# Patient Record
Sex: FEMALE | Race: WHITE | URBAN - METROPOLITAN AREA
[De-identification: names, ages, dates, MRNs, and addresses within clinical notes are randomized per-mention and may not be internally consistent; named-entity substitution may affect disease eponyms.]

---

## 2019-12-23 ENCOUNTER — OFFICE VISIT (OUTPATIENT)
Dept: FAMILY MEDICINE CLINIC | Age: 24
End: 2019-12-23
Payer: COMMERCIAL

## 2019-12-23 VITALS
BODY MASS INDEX: 24.99 KG/M2 | RESPIRATION RATE: 18 BRPM | TEMPERATURE: 98.2 F | HEART RATE: 84 BPM | WEIGHT: 164.9 LBS | SYSTOLIC BLOOD PRESSURE: 141 MMHG | DIASTOLIC BLOOD PRESSURE: 89 MMHG | OXYGEN SATURATION: 99 % | HEIGHT: 68 IN

## 2019-12-23 PROCEDURE — 99213 OFFICE O/P EST LOW 20 MIN: CPT | Performed by: PHYSICIAN ASSISTANT

## 2019-12-23 RX ORDER — BENZONATATE 200 MG/1
200 CAPSULE ORAL 3 TIMES DAILY PRN
Qty: 21 CAPSULE | Refills: 0 | Status: SHIPPED | OUTPATIENT
Start: 2019-12-23 | End: 2019-12-30

## 2019-12-23 RX ORDER — AZITHROMYCIN 250 MG/1
250 TABLET, FILM COATED ORAL SEE ADMIN INSTRUCTIONS
Qty: 6 TABLET | Refills: 0 | Status: SHIPPED | OUTPATIENT
Start: 2019-12-23 | End: 2019-12-28

## 2019-12-23 RX ORDER — PREDNISONE 20 MG/1
20 TABLET ORAL 2 TIMES DAILY
Qty: 10 TABLET | Refills: 0 | Status: SHIPPED | OUTPATIENT
Start: 2019-12-23 | End: 2019-12-28

## 2019-12-23 RX ORDER — ALPRAZOLAM 0.5 MG/1
0.5 TABLET ORAL
COMMUNITY

## 2019-12-23 RX ORDER — ASPIRIN 81 MG/1
81 TABLET, CHEWABLE ORAL
COMMUNITY

## 2019-12-23 RX ORDER — ALBUTEROL SULFATE 90 UG/1
AEROSOL, METERED RESPIRATORY (INHALATION)
COMMUNITY
Start: 2018-04-23

## 2019-12-23 NOTE — PROGRESS NOTES
[address] [facility]  [address] [facility] [address]  Phone: [phone]  Fax: [phone]       [facility] Walk - In    Pt Name: Saverio Libman  MRN: [id number]  [place] 1995  Date of evaluation: 12/23/2019  Provider: Jason Cárdenas, [facility]       Chief Complaint   Patient presents with    Cough     SOB, Maintance inhaler            HISTORY OF PRESENT ILLNESS  (Location/Symptom, Timing/Onset, Context/Setting, Quality, Duration, Modifying Factors, Severity.)   Saverio Libman is a 21 y.o. White [1] female who presents to the office for evaluation of      Cough   This is a new problem. The current episode started in the past 7 days. The problem has been gradually worsening. The problem occurs every few minutes. The cough is productive of sputum. Associated symptoms include ear congestion, headaches, nasal congestion, postnasal drip and rhinorrhea. Pertinent negatives include no chest pain, chills, ear pain, fever, heartburn, hemoptysis, myalgias, rash, sore throat, shortness of breath, sweats, weight loss or wheezing. She has tried OTC cough suppressant for the symptoms. Nursing Notes were reviewed. REVIEW OF SYSTEMS    (2-9 systems for level 4, 10 or more for level 5)     Review of Systems   Constitutional: Negative for chills, diaphoresis, fever and weight loss. HENT: Positive for congestion, postnasal drip, rhinorrhea and sinus pain. Negative for ear pain and sore throat. Eyes: Negative. Respiratory: Positive for cough. Negative for hemoptysis, shortness of breath and wheezing. Cardiovascular: Negative for chest pain. Gastrointestinal: Negative. Negative for heartburn. Genitourinary: Negative. Musculoskeletal: Negative for myalgias. Skin: Negative for rash. Neurological: Positive for headaches. Except as noted above the remainder of the review of systems was reviewed andnegative.        PAST MEDICAL HISTORY   History reviewed. No past medical history on file. SURGICAL HISTORY     History reviewed. No past surgical history on file. CURRENT MEDICATIONS       Current Outpatient Medications   Medication Sig Dispense Refill    albuterol sulfate HFA (PROAIR HFA) 108 (90 Base) MCG/ACT inhaler inhale 2 puffs by mouth every 4 to 6 hours if needed for wheezing      ALPRAZolam (XANAX) 0.5 MG tablet Take 0.5 mg by mouth.  aspirin 81 MG chewable tablet Take 81 mg by mouth       No current facility-administered medications for this visit. ALLERGIES     Latex and Sulfamethoxazole-trimethoprim    FAMILY HISTORY     No family history on file. No family status information on file. SOCIAL HISTORY      reports that she has never smoked. She has never used smokeless tobacco.      PHYSICAL EXAM    (up to 7 for level 4, 8 or more for level 5)     Vitals:    12/23/19 1810   BP: (!) 141/89   Pulse: 84   Resp: 18   Temp: 98.2 °F (36.8 °C)   TempSrc: Temporal   SpO2: 99%   Weight: 164 lb 14.4 oz (74.8 kg)   Height: 5' 8\" (1.727 m)         Physical Exam  Vitals signs and nursing note reviewed. Constitutional:       Appearance: Normal appearance. HENT:      Head: Normocephalic and atraumatic. Right Ear: Tympanic membrane, ear canal and external ear normal. There is no impacted cerumen. Left Ear: Tympanic membrane, ear canal and external ear normal. There is no impacted cerumen. Nose: Congestion and rhinorrhea present. Mouth/Throat:      Mouth: Mucous membranes are moist.      Pharynx: Posterior oropharyngeal erythema present. Eyes:      General: No scleral icterus. Right eye: No discharge. Extraocular Movements: Extraocular movements intact. Conjunctiva/sclera: Conjunctivae normal.      Pupils: Pupils are equal, round, and reactive to light. Cardiovascular:      Rate and Rhythm: Normal rate and regular rhythm.    Pulmonary:      Effort: Pulmonary effort is normal.      Breath sounds: Normal breath sounds. Neurological:      Mental Status: She is alert. DIFFERENTIAL DIAGNOSIS:       Chalo Sotomayor reviewed the disposition diagnosis with the patient and or their family/guardian. I have answered their questions and given discharge instructions. They voiced understanding of these instructions and did not have anyfurther questions or complaints. PROCEDURES:  No orders of the defined types were placed in this encounter. No results found for this visit on 12/23/19. FINALIMPRESSION      1. Bronchitis    2. Acute sinusitis, recurrence not specified, unspecified location            PLAN     Return if symptoms worsen or fail to improve. DISCHARGEMEDICATIONS:  Orders Placed This Encounter   Medications    azithromycin (ZITHROMAX) 250 MG tablet     Sig: Take 1 tablet by mouth See Admin Instructions for 5 days 500mg on day 1 followed by 250mg on days 2 - 5     Dispense:  6 tablet     Refill:  0    predniSONE (DELTASONE) 20 MG tablet     Sig: Take 1 tablet by mouth 2 times daily for 5 days     Dispense:  10 tablet     Refill:  0    benzonatate (TESSALON) 200 MG capsule     Sig: Take 1 capsule by mouth 3 times daily as needed for Cough     Dispense:  21 capsule     Refill:  0         Plan:  Based on the duration and severity of the symptoms-- I will treat this as bacterial at this time. Patient instructed to complete antibiotic prescription fully. May use Motrin/Tylenol for fever/pain. Saline washes, salt water gargles and over the counter preparations if desired. Patient agreeable to treatment plan. Educational materials provided on AVS.  Follow up if symptoms do not improve/worsen. Patient instructed to return to the office if symptoms worsen, return, or have any other concerns. Patient understands and is agreeable.          Krysten Wong PA-C 1/7/2020 7:29 PM

## 2019-12-23 NOTE — PATIENT INSTRUCTIONS
Patient Education        Bronchitis: Care Instructions  Your Care Instructions    Bronchitis is inflammation of the bronchial tubes, which carry air to the lungs. The tubes swell and produce mucus, or phlegm. The mucus and inflamed bronchial tubes make you cough. You may have trouble breathing. Most cases of bronchitis are caused by viruses like those that cause colds. Antibiotics usually do not help and they may be harmful. Bronchitis usually develops rapidly and lasts about 2 to 3 weeks in otherwise healthy people. Follow-up care is a key part of your treatment and safety. Be sure to make and go to all appointments, and call your doctor if you are having problems. It's also a good idea to know your test results and keep a list of the medicines you take. How can you care for yourself at home? · Take all medicines exactly as prescribed. Call your doctor if you think you are having a problem with your medicine. · Get some extra rest.  · Take an over-the-counter pain medicine, such as acetaminophen (Tylenol), ibuprofen (Advil, Motrin), or naproxen (Aleve) to reduce fever and relieve body aches. Read and follow all instructions on the label. · Do not take two or more pain medicines at the same time unless the doctor told you to. Many pain medicines have acetaminophen, which is Tylenol. Too much acetaminophen (Tylenol) can be harmful. · Take an over-the-counter cough medicine that contains dextromethorphan to help quiet a dry, hacking cough so that you can sleep. Avoid cough medicines that have more than one active ingredient. Read and follow all instructions on the label. · Breathe moist air from a humidifier, hot shower, or sink filled with hot water. The heat and moisture will thin mucus so you can cough it out. · Do not smoke. Smoking can make bronchitis worse. If you need help quitting, talk to your doctor about stop-smoking programs and medicines.  These can increase your chances of quitting for good.  When should you call for help? Call 911 anytime you think you may need emergency care. For example, call if:    · You have severe trouble breathing.    Call your doctor now or seek immediate medical care if:    · You have new or worse trouble breathing.     · You cough up dark brown or bloody mucus (sputum).     · You have a new or higher fever.     · You have a new rash.    Watch closely for changes in your health, and be sure to contact your doctor if:    · You cough more deeply or more often, especially if you notice more mucus or a change in the color of your mucus.     · You are not getting better as expected. Where can you learn more? Go to https://Cell>Point.[employer]Trade Systems. org and sign in to your [employer] account. Enter H333 in the LogicSource box to learn more about \"Bronchitis: Care Instructions. \"     If you do not have an account, please click on the \"Sign Up Now\" link. Current as of: September 5, 2018  Content Version: 12.1  © 6246-7808 [employer]. Care instructions adapted under license by [facility] ([facility]). If you have questions about a medical condition or this instruction, always ask your healthcare professional. [place] [address] any warranty or liability for your use of this information. Patient Education        Sinusitis: Care Instructions  Your Care Instructions    Sinusitis is an infection of the lining of the sinus cavities in your head. Sinusitis often follows a cold. It causes pain and pressure in your head and face. In most cases, sinusitis gets better on its own in 1 to 2 weeks. But some mild symptoms may last for several weeks. Sometimes antibiotics are needed. Follow-up care is a key part of your treatment and safety. Be sure to make and go to all appointments, and call your doctor if you are having problems. It's also a good idea to know your test results and keep a list of the medicines you take.   How can you care for

## 2020-01-07 ASSESSMENT — ENCOUNTER SYMPTOMS
HEARTBURN: 0
EYES NEGATIVE: 1
COUGH: 1
RHINORRHEA: 1
SINUS PAIN: 1
WHEEZING: 0
GASTROINTESTINAL NEGATIVE: 1
HEMOPTYSIS: 0
SORE THROAT: 0
SHORTNESS OF BREATH: 0

## 2020-01-15 ENCOUNTER — HOSPITAL ENCOUNTER (OUTPATIENT)
Age: 25
Setting detail: SPECIMEN
Discharge: HOME | End: 2020-01-15
Payer: COMMERCIAL

## 2020-01-15 LAB
-: ABNORMAL
AMORPHOUS: ABNORMAL
BACTERIA: ABNORMAL
BILIRUBIN URINE: NEGATIVE
CASTS UA: ABNORMAL /LPF (ref 0–8)
COLOR: YELLOW
COMMENT UA: ABNORMAL
CRYSTALS, UA: ABNORMAL /HPF
EPITHELIAL CELLS UA: ABNORMAL /HPF (ref 0–5)
GLUCOSE URINE: NEGATIVE
KETONES, URINE: NEGATIVE
LEUKOCYTE ESTERASE, URINE: ABNORMAL
MUCUS: ABNORMAL
NITRITE, URINE: NEGATIVE
OTHER OBSERVATIONS UA: ABNORMAL
PH UA: 5.5 (ref 5–8)
PROTEIN UA: NEGATIVE
RBC UA: ABNORMAL /HPF (ref 0–4)
RENAL EPITHELIAL, UA: ABNORMAL /HPF
SPECIFIC GRAVITY UA: 1.01 (ref 1–1.03)
TRICHOMONAS: ABNORMAL
TURBIDITY: CLEAR
URINE HGB: NEGATIVE
UROBILINOGEN, URINE: NORMAL
WBC UA: ABNORMAL /HPF (ref 0–5)
YEAST: ABNORMAL

## 2020-01-15 PROCEDURE — 87186 SC STD MICRODIL/AGAR DIL: CPT

## 2020-01-15 PROCEDURE — 87086 URINE CULTURE/COLONY COUNT: CPT

## 2020-01-15 PROCEDURE — 87088 URINE BACTERIA CULTURE: CPT

## 2020-01-15 PROCEDURE — 81001 URINALYSIS AUTO W/SCOPE: CPT

## 2020-01-16 LAB
CULTURE: ABNORMAL
Lab: ABNORMAL
SPECIMEN DESCRIPTION: ABNORMAL

## 2020-10-20 ENCOUNTER — HOSPITAL ENCOUNTER (OUTPATIENT)
Age: 25
Setting detail: SPECIMEN
Discharge: HOME | End: 2020-10-20
Payer: COMMERCIAL

## 2020-10-20 LAB
ALBUMIN SERPL-MCNC: 5.1 G/DL (ref 3.5–5.2)
ALBUMIN/GLOBULIN RATIO: 1.8 (ref 1–2.5)
ALP BLD-CCNC: 68 U/L (ref 35–104)
ALT SERPL-CCNC: 17 U/L (ref 5–33)
ANION GAP SERPL CALCULATED.3IONS-SCNC: 11 MMOL/L (ref 9–17)
AST SERPL-CCNC: 27 U/L
BILIRUB SERPL-MCNC: 0.4 MG/DL (ref 0.3–1.2)
BUN BLDV-MCNC: 8 MG/DL (ref 6–20)
BUN/CREAT BLD: NORMAL (ref 9–20)
CALCIUM SERPL-MCNC: 10 MG/DL (ref 8.6–10.4)
CHLORIDE BLD-SCNC: 104 MMOL/L (ref 98–107)
CO2: 27 MMOL/L (ref 20–31)
CREAT SERPL-MCNC: 0.81 MG/DL (ref 0.5–0.9)
GFR AFRICAN AMERICAN: >60 ML/MIN
GFR NON-AFRICAN AMERICAN: >60 ML/MIN
GFR SERPL CREATININE-BSD FRML MDRD: NORMAL ML/MIN/{1.73_M2}
GFR SERPL CREATININE-BSD FRML MDRD: NORMAL ML/MIN/{1.73_M2}
GLUCOSE BLD-MCNC: 75 MG/DL (ref 70–99)
HCT VFR BLD CALC: 42.4 % (ref 36.3–47.1)
HEMOGLOBIN: 13.7 G/DL (ref 11.9–15.1)
MCH RBC QN AUTO: 31.6 PG (ref 25.2–33.5)
MCHC RBC AUTO-ENTMCNC: 32.3 G/DL (ref 28.4–34.8)
MCV RBC AUTO: 97.9 FL (ref 82.6–102.9)
NRBC AUTOMATED: 0 PER 100 WBC
PDW BLD-RTO: 13 % (ref 11.8–14.4)
PLATELET # BLD: 247 K/UL (ref 138–453)
PMV BLD AUTO: 10.3 FL (ref 8.1–13.5)
POTASSIUM SERPL-SCNC: 3.9 MMOL/L (ref 3.7–5.3)
RBC # BLD: 4.33 M/UL (ref 3.95–5.11)
SODIUM BLD-SCNC: 142 MMOL/L (ref 135–144)
TOTAL PROTEIN: 7.9 G/DL (ref 6.4–8.3)
TSH SERPL DL<=0.05 MIU/L-ACNC: 1.86 MIU/L (ref 0.3–5)
WBC # BLD: 6.9 K/UL (ref 3.5–11.3)

## 2020-10-20 PROCEDURE — 85027 COMPLETE CBC AUTOMATED: CPT

## 2020-10-20 PROCEDURE — 84443 ASSAY THYROID STIM HORMONE: CPT

## 2020-10-20 PROCEDURE — 80053 COMPREHEN METABOLIC PANEL: CPT

## 2021-07-16 ENCOUNTER — OFFICE VISIT (OUTPATIENT)
Dept: FAMILY MEDICINE CLINIC | Age: 26
End: 2021-07-16
Payer: COMMERCIAL

## 2021-07-16 ENCOUNTER — HOSPITAL ENCOUNTER (OUTPATIENT)
Age: 26
Setting detail: SPECIMEN
Discharge: HOME | End: 2021-07-16
Payer: COMMERCIAL

## 2021-07-16 VITALS
WEIGHT: 153 LBS | BODY MASS INDEX: 23.19 KG/M2 | TEMPERATURE: 99 F | HEART RATE: 67 BPM | SYSTOLIC BLOOD PRESSURE: 137 MMHG | HEIGHT: 68 IN | DIASTOLIC BLOOD PRESSURE: 87 MMHG

## 2021-07-16 DIAGNOSIS — R05.9 COUGH: ICD-10-CM

## 2021-07-16 DIAGNOSIS — J06.9 VIRAL URI: Primary | ICD-10-CM

## 2021-07-16 PROCEDURE — 1036F TOBACCO NON-USER: CPT | Performed by: NURSE PRACTITIONER

## 2021-07-16 PROCEDURE — 99212 OFFICE O/P EST SF 10 MIN: CPT | Performed by: NURSE PRACTITIONER

## 2021-07-16 PROCEDURE — G8420 CALC BMI NORM PARAMETERS: HCPCS | Performed by: NURSE PRACTITIONER

## 2021-07-16 PROCEDURE — G8427 DOCREV CUR MEDS BY ELIG CLIN: HCPCS | Performed by: NURSE PRACTITIONER

## 2021-07-16 RX ORDER — BUPROPION HYDROCHLORIDE 150 MG/1
150 TABLET ORAL DAILY
COMMUNITY
Start: 2021-07-12 | End: 2021-08-27

## 2021-07-16 SDOH — ECONOMIC STABILITY: FOOD INSECURITY: WITHIN THE PAST 12 MONTHS, THE FOOD YOU BOUGHT JUST DIDN'T LAST AND YOU DIDN'T HAVE MONEY TO GET MORE.: NEVER TRUE

## 2021-07-16 SDOH — ECONOMIC STABILITY: FOOD INSECURITY: WITHIN THE PAST 12 MONTHS, YOU WORRIED THAT YOUR FOOD WOULD RUN OUT BEFORE YOU GOT MONEY TO BUY MORE.: NEVER TRUE

## 2021-07-16 ASSESSMENT — PATIENT HEALTH QUESTIONNAIRE - PHQ9
SUM OF ALL RESPONSES TO PHQ QUESTIONS 1-9: 0
SUM OF ALL RESPONSES TO PHQ QUESTIONS 1-9: 0
SUM OF ALL RESPONSES TO PHQ9 QUESTIONS 1 & 2: 0
1. LITTLE INTEREST OR PLEASURE IN DOING THINGS: 0
SUM OF ALL RESPONSES TO PHQ QUESTIONS 1-9: 0
2. FEELING DOWN, DEPRESSED OR HOPELESS: 0

## 2021-07-16 ASSESSMENT — ENCOUNTER SYMPTOMS
COUGH: 1
SHORTNESS OF BREATH: 1

## 2021-07-16 ASSESSMENT — SOCIAL DETERMINANTS OF HEALTH (SDOH): HOW HARD IS IT FOR YOU TO PAY FOR THE VERY BASICS LIKE FOOD, HOUSING, MEDICAL CARE, AND HEATING?: NOT HARD AT ALL

## 2021-07-16 NOTE — PATIENT INSTRUCTIONS
Patient Education        Upper Respiratory Infection (Cold): Care Instructions  Your Care Instructions     An upper respiratory infection, or URI, is an infection of the nose, sinuses, or throat. URIs are spread by coughs, sneezes, and direct contact. The common cold is the most frequent kind of URI. The flu and sinus infections are other kinds of URIs. Almost all URIs are caused by viruses. Antibiotics won't cure them. But you can treat most infections with home care. This may include drinking lots of fluids and taking over-the-counter pain medicine. You will probably feel better in 4 to 10 days. The doctor has checked you carefully, but problems can develop later. If you notice any problems or new symptoms, get medical treatment right away. Follow-up care is a key part of your treatment and safety. Be sure to make and go to all appointments, and call your doctor if you are having problems. It's also a good idea to know your test results and keep a list of the medicines you take. How can you care for yourself at home? · To prevent dehydration, drink plenty of fluids. Choose water and other caffeine-free clear liquids until you feel better. If you have kidney, heart, or liver disease and have to limit fluids, talk with your doctor before you increase the amount of fluids you drink. · Take an over-the-counter pain medicine, such as acetaminophen (Tylenol), ibuprofen (Advil, Motrin), or naproxen (Aleve). Read and follow all instructions on the label. · Before you use cough and cold medicines, check the label. These medicines may not be safe for young children or for people with certain health problems. · Be careful when taking over-the-counter cold or flu medicines and Tylenol at the same time. Many of these medicines have acetaminophen, which is Tylenol. Read the labels to make sure that you are not taking more than the recommended dose. Too much acetaminophen (Tylenol) can be harmful.   · Get plenty of rest.  · Do not smoke or allow others to smoke around you. If you need help quitting, talk to your doctor about stop-smoking programs and medicines. These can increase your chances of quitting for good. When should you call for help? Call 911 anytime you think you may need emergency care. For example, call if:    · You have severe trouble breathing. Call your doctor now or seek immediate medical care if:    · You seem to be getting much sicker.     · You have new or worse trouble breathing.     · You have a new or higher fever.     · You have a new rash. Watch closely for changes in your health, and be sure to contact your doctor if:    · You have a new symptom, such as a sore throat, an earache, or sinus pain.     · You cough more deeply or more often, especially if you notice more mucus or a change in the color of your mucus.     · You do not get better as expected. Where can you learn more? Go to https://Philoptima.Ha[employer]ang Online Information Technology. org and sign in to your [employer] account. Enter N116 in the Marketshot box to learn more about \"Upper Respiratory Infection (Cold): Care Instructions. \"     If you do not have an account, please click on the \"Sign Up Now\" link. Current as of: October 26, 2020               Content Version: 12.9  © 2006-2021 Healthwise, Incorporated. Care instructions adapted under license by [facility] ([facility]). If you have questions about a medical condition or this instruction, always ask your healthcare professional. [place] [address] any warranty or liability for your use of this information.        Hot steamy showers  Dahlia Holes Med nasal rinse   Cough drops  Take zinc 50 mg and Vit C 1000 mg   Use your inhaler as ordered for cough and shortness of breath  Warm tea and honey for congestion

## 2021-07-16 NOTE — PROGRESS NOTES
[address] WALK-IN  [facility] [address] [place]  Dept: [phone]  Dept Fax: [phone]    Diann Mcintosh is a 31 y.o. female who presents today for her medical conditions/complaints as noted below. Diann Mcintosh is c/o of Cough (constant cough asthmatic and chest heaviness times 2 weeks)      HPI:    Diann Mcintosh is a 31 y.o. female who presents with  Danni presents to the [employer] with c/o cough. She has a history of asthma. She has had a cough for the past two weeks, She has an inhaler and has used it at times, but does not carry it with her. She saw her primary doctor on Monday and he assured her that her lungs were clear and she her lungs were doing their job. She is still concerned due to the cough and would like to be examined, especially her lungs. She reports she has had pneumonia many times and she is concerned this is going to develop into pneumonia. Cough  This is a new problem. The current episode started 1 to 4 weeks ago. The problem has been unchanged. The cough is non-productive. Associated symptoms include shortness of breath. Associated symptoms comments: At times she is short of breath, mostly outside. The symptoms are aggravated by lying down (being outside). Treatments tried: albuterol inhaler as needed. The treatment provided mild relief. Her past medical history is significant for asthma. No past medical history on file. Current Outpatient Medications   Medication Sig Dispense Refill    buPROPion (WELLBUTRIN XL) 150 MG extended release tablet Take 150 mg by mouth daily      albuterol sulfate HFA (PROAIR HFA) 108 (90 Base) MCG/ACT inhaler inhale 2 puffs by mouth every 4 to 6 hours if needed for wheezing      ALPRAZolam (XANAX) 0.5 MG tablet Take 0.5 mg by mouth.  aspirin 81 MG chewable tablet Take 81 mg by mouth       No current facility-administered medications for this visit.      Allergies Allergen Reactions    Latex     Sulfamethoxazole-Trimethoprim Other (See Comments) and Hives       Subjective:      Review of Systems   Constitutional: Negative for activity change and appetite change. Respiratory: Positive for cough and shortness of breath. Objective:     Physical Exam  Vitals reviewed. Constitutional:       Appearance: Normal appearance. HENT:      Right Ear: Ear canal and external ear normal. A middle ear effusion is present. There is no impacted cerumen. Left Ear: Ear canal and external ear normal. A middle ear effusion is present. There is no impacted cerumen. Mouth/Throat:      Mouth: Mucous membranes are moist.      Pharynx: Posterior oropharyngeal erythema present. No oropharyngeal exudate. Comments: Post nasal drainage  Cardiovascular:      Rate and Rhythm: Normal rate and regular rhythm. Pulses: Normal pulses. Heart sounds: Normal heart sounds. Pulmonary:      Breath sounds: Normal breath sounds. Neurological:      Mental Status: She is alert. /87   Pulse 67   Temp 99 °F (37.2 °C) (Temporal)   Ht 5' 8\" (1.727 m)   Wt 153 lb (69.4 kg)   BMI 23.26 kg/m²     Assessment:      Diagnosis Orders   1. Viral URI     2. Cough  COVID-19     No results found for this visit on 07/16/21. Plan:       Symptoms management handout provided with after-visit summary. Return if symptoms worsen or fail to improve. No orders of the defined types were placed in this encounter.        Electronically signed by MARC Bah CNP on 7/16/2021 at 6:32 PM

## 2021-07-18 LAB
SARS-COV-2: NORMAL
SARS-COV-2: NOT DETECTED
SOURCE: NORMAL

## 2021-08-27 ENCOUNTER — OFFICE VISIT (OUTPATIENT)
Dept: FAMILY MEDICINE CLINIC | Age: 26
End: 2021-08-27
Payer: COMMERCIAL

## 2021-08-27 ENCOUNTER — HOSPITAL ENCOUNTER (OUTPATIENT)
Age: 26
Setting detail: SPECIMEN
Discharge: HOME | End: 2021-08-27
Payer: COMMERCIAL

## 2021-08-27 VITALS
TEMPERATURE: 99.1 F | WEIGHT: 152.3 LBS | HEART RATE: 69 BPM | SYSTOLIC BLOOD PRESSURE: 118 MMHG | BODY MASS INDEX: 23.08 KG/M2 | HEIGHT: 68 IN | OXYGEN SATURATION: 98 % | DIASTOLIC BLOOD PRESSURE: 81 MMHG | RESPIRATION RATE: 13 BRPM

## 2021-08-27 DIAGNOSIS — Z20.822 SUSPECTED COVID-19 VIRUS INFECTION: ICD-10-CM

## 2021-08-27 DIAGNOSIS — R05.9 COUGH: ICD-10-CM

## 2021-08-27 DIAGNOSIS — R53.83 FATIGUE, UNSPECIFIED TYPE: ICD-10-CM

## 2021-08-27 DIAGNOSIS — R43.2 LOSS OF TASTE: Primary | ICD-10-CM

## 2021-08-27 PROCEDURE — 1036F TOBACCO NON-USER: CPT | Performed by: NURSE PRACTITIONER

## 2021-08-27 PROCEDURE — 99213 OFFICE O/P EST LOW 20 MIN: CPT | Performed by: NURSE PRACTITIONER

## 2021-08-27 PROCEDURE — G8427 DOCREV CUR MEDS BY ELIG CLIN: HCPCS | Performed by: NURSE PRACTITIONER

## 2021-08-27 PROCEDURE — G8420 CALC BMI NORM PARAMETERS: HCPCS | Performed by: NURSE PRACTITIONER

## 2021-08-27 RX ORDER — FLUTICASONE PROPIONATE 50 MCG
2 SPRAY, SUSPENSION (ML) NASAL DAILY
Qty: 1 BOTTLE | Refills: 0 | Status: SHIPPED | OUTPATIENT
Start: 2021-08-27

## 2021-08-27 ASSESSMENT — ENCOUNTER SYMPTOMS
SORE THROAT: 0
VOMITING: 0
VISUAL CHANGE: 0
CHANGE IN BOWEL HABIT: 0
SWOLLEN GLANDS: 0
COUGH: 1
ABDOMINAL PAIN: 0
NAUSEA: 0

## 2021-08-27 NOTE — LETTER
[address] [facility]  [address] [place]  [address]  [facility] [address]  Phone: [phone]  Fax: [phone]    MARC Fu CNP        2021     Patient: Krysten Massey   YOB: 1995   Date of Visit: 2021       To Whom It May Concern: It is my medical opinion that Krysten Massey should remain out of work until [facility] results are back in 1-4 days. .    If you have any questions or concerns, please don't hesitate to call.     Sincerely,        MARC Fu CNP

## 2021-08-27 NOTE — PROGRESS NOTES
[address] [facility]  [address] [facility] [facility]  Dept: [phone]  Dept Fax: [phone]    Brent Contreras is a 31 y.o. female who presents to the urgent care today for her medical conditions/complaints as notedbelow. Brent Contreras is c/o of Other (loss of taste x 1 day), Fatigue (x 4 days ), Cough, and Headache      HPI:     31 yr old female presents with her spouse, both c/o loss luba taste, HA, fatigue and cough. x4d  Started after going to [place]  Here for covid testing  Hx asthma, wheezing in morning then resolves, does not need inhaler refill, declines steroids. No sob or chest heaviness  mucinex tried  Feels well overall  Not covid vaccinated    Other  This is a new problem. The current episode started in the past 7 days (x4d). The problem occurs constantly. The problem has been unchanged. Associated symptoms include congestion (nasal), coughing, fatigue and headaches. Pertinent negatives include no abdominal pain, anorexia, arthralgias, change in bowel habit, chest pain, chills, diaphoresis, fever, joint swelling, myalgias, nausea, neck pain, numbness, rash, sore throat, swollen glands, urinary symptoms, vertigo, visual change, vomiting or weakness. Associated symptoms comments: Loss of tast. Nothing aggravates the symptoms. Treatments tried: mucinex. Fatigue  Associated symptoms include congestion (nasal), coughing, fatigue and headaches. Pertinent negatives include no abdominal pain, anorexia, arthralgias, change in bowel habit, chest pain, chills, diaphoresis, fever, joint swelling, myalgias, nausea, neck pain, numbness, rash, sore throat, swollen glands, urinary symptoms, vertigo, visual change, vomiting or weakness. Cough  Associated symptoms include headaches. Pertinent negatives include no chest pain, chills, fever, myalgias, rash or sore throat. Headache   Associated symptoms include coughing.  Pertinent negatives include no abdominal pain, anorexia, fever, nausea, neck pain, numbness, sore throat, swollen glands, visual change, vomiting or weakness. No past medical history on file. Current Outpatient Medications   Medication Sig Dispense Refill    fluticasone (FLONASE) 50 MCG/ACT nasal spray 2 sprays by Nasal route daily 1 Bottle 0    albuterol sulfate HFA (PROAIR HFA) 108 (90 Base) MCG/ACT inhaler inhale 2 puffs by mouth every 4 to 6 hours if needed for wheezing      ALPRAZolam (XANAX) 0.5 MG tablet Take 0.5 mg by mouth.  aspirin 81 MG chewable tablet Take 81 mg by mouth       No current facility-administered medications for this visit. Allergies   Allergen Reactions    Latex     Sulfamethoxazole-Trimethoprim Other (See Comments) and Hives       Subjective:      Review of Systems   Constitutional: Positive for fatigue. Negative for chills, diaphoresis and fever. HENT: Positive for congestion (nasal). Negative for sore throat. Respiratory: Positive for cough. Cardiovascular: Negative for chest pain. Gastrointestinal: Negative for abdominal pain, anorexia, change in bowel habit, nausea and vomiting. Musculoskeletal: Negative for arthralgias, joint swelling, myalgias and neck pain. Skin: Negative for rash. Neurological: Positive for headaches. Negative for vertigo, weakness and numbness. All other systems reviewed and are negative. 14 systems reviewed and negative except as listed in HPI. Objective:     Physical Exam  Vitals and nursing note reviewed. Constitutional:       General: She is not in acute distress. Appearance: Normal appearance. She is well-developed. She is not ill-appearing, toxic-appearing or diaphoretic. Comments: Nontoxic  Very well appearing, laughing and joking with spouse in room   HENT:      Head: Normocephalic and atraumatic.       Right Ear: Tympanic membrane and external ear normal.      Left Ear: Tympanic membrane and external ear normal.      Nose: Congestion present. No rhinorrhea. Mouth/Throat:      Mouth: Mucous membranes are moist.      Pharynx: No oropharyngeal exudate or posterior oropharyngeal erythema. Eyes:      General: No scleral icterus. Right eye: No discharge. Left eye: No discharge. Extraocular Movements: Extraocular movements intact. Conjunctiva/sclera: Conjunctivae normal.      Pupils: Pupils are equal, round, and reactive to light. Cardiovascular:      Rate and Rhythm: Normal rate and regular rhythm. Pulses: Normal pulses. Heart sounds: Normal heart sounds. Pulmonary:      Effort: Pulmonary effort is normal. No respiratory distress. Breath sounds: Normal breath sounds. No stridor. No wheezing, rhonchi or rales. Chest:      Chest wall: No tenderness. Abdominal:      General: Bowel sounds are normal. There is no distension. Palpations: Abdomen is soft. Tenderness: There is no abdominal tenderness. Musculoskeletal:         General: No tenderness or deformity. Normal range of motion. Cervical back: Normal range of motion and neck supple. Lymphadenopathy:      Cervical: No cervical adenopathy. Skin:     General: Skin is warm and dry. Findings: No rash ( no rash to visible skin). Neurological:      General: No focal deficit present. Mental Status: She is alert and oriented to person, place, and time. Motor: No abnormal muscle tone. Coordination: Coordination normal.   Psychiatric:         Mood and Affect: Mood normal.         Behavior: Behavior normal.       /81   Pulse 69   Temp 99.1 °F (37.3 °C)   Resp 13   Ht 5' 8\" (1.727 m)   Wt 152 lb 4.8 oz (69.1 kg)   SpO2 98%   Breastfeeding No   BMI 23.16 kg/m²     Assessment:       Diagnosis Orders   1. Loss of taste  COVID-19   2. Fatigue, unspecified type  COVID-19   3. Cough  COVID-19   4.  Suspected COVID-19 virus infection  COVID-19       Plan:    flonase for nasal congestion, pnd  Declines inhaler refill or steroids  Work note  Hx asthma  Here with spouse who has same sx  Reviewed over-the-counter treatments for symptom management. Will send out COVID19 testing. Possible treatment alterations based on the results. Patient instructed to self-quarantine until testing results are back. Patient instructed not to return to work until results are back. Tylenol as needed for fever/pain. Encouraged adequate hydration and rest.  The patient indicates understanding of these issues and agrees with the plan. Educational materials provided on AVS.  Follow up if symptoms do not improve/worsen. Discussed symptoms that will warrant urgent ED evaluation/treatment. Return if symptoms worsen or fail to improve, for Make an Appt. with your Primary Care in 1 week. Orders Placed This Encounter   Medications    fluticasone (FLONASE) 50 MCG/ACT nasal spray     Si sprays by Nasal route daily     Dispense:  1 Bottle     Refill:  0         Patient given educational materials - see patient instructions. Discussed use, benefit, and side effects of prescribed medications. All patient questions answered. Pt voicedunderstanding.     Electronically signed by Severo Moron, APRN - CNP on 2021 at 7:24 PM

## 2021-08-27 NOTE — PATIENT INSTRUCTIONS
Follow up with family doctor in 1 week as needed. Return immediately if worse, new symptoms develop, symptoms persist or have any questions or concerns. Push fluids, keep hydrated  Cool mist humidifier bedside  Continue all medications as prescribed  May alternate tylenol/motrin over the counter for pain or fever, take per package instructions. The COVID-19 test that was done today can take 1-6 days for results. Until then you should assume you have this disease and adhere to home isolation as described below. When we get the test results back, one of the following readings will be obtained. 1. A positive test means you have the virus. 2.  An inconclusive test means it wasn't sure if you have the virus or not. An inconclusive test result is treated as a positive result and recommendations  are the same as a positive test result. We may ask you to repeat this test in this circumstance. 3.  A negative test means you probably do not have the virus, but it is not conclusive. Prevention steps for People with positive or inconclusive test results or suspected  COVID-19 (including persons under investigation) who do not need to be hospitalized  and   People with confirmed COVID-19 who were hospitalized and determined to be medically stable to go home    You can be around others after:    10 days since symptoms first appeared and  24 hours with no fever without the use of fever-reducing medications and  Other symptoms of COVID-19 are improving*  *Loss of taste and smell may persist for weeks or months after recovery and need not delay the end of isolation    Most people do not require testing to decide when they can be around others; however, if your healthcare provider recommends testing, they will let you know when you can resume being around others based on your test results.     Note that these recommendations do not apply to persons with severe COVID-19 or with severely weakened immune systems (immunocompromised). These persons should follow the guidance below for I was severely ill with COVID-19 or have a severely weakened immune system (immunocompromised) due to a health condition or medication. When can I be around others?     [email or web address]    Contacts who are NOT healthcare providers or first responders and are asymptomatic (no fever,  cough, shortness of breath, or difficulty breathing) should self-quarantine for 14 days from the last  date of exposure to confirmed COVID-19. Your healthcare provider and public health staff will evaluate whether you can be cared for at home. If it is determined that you do not need to be hospitalized and can be isolated at home, you will be monitored by staff from your health department. You should follow the prevention steps below until a healthcare provider or local or state health department says you can return to your normal activities. Stay home except to get medical care  People who are mildly ill with COVID-19 are able to isolate at home during their illness. You should restrict activities outside your home, except for getting medical care. Do not go to work, school, or public areas. Avoid using public transportation, ride-sharing, or taxis. Separate yourself from other people and animals in your home  People: As much as possible, you should stay in a specific room and away from other people in your home. Also, you should use a separate bathroom, if available. Animals: You should restrict contact with pets and other animals while you are sick with COVID-19, just like you would around other people. When possible, have another member of your household care for your animals while you are sick. If you are sick with COVID-19, avoid contact with your pet, including petting, snuggling, being kissed or licked, and sharing food.  If you must care for your pet or be around animals while you are sick, wash your hands before and after you interact with pets and wear a facemask. Call ahead before visiting your doctor  If you have a medical appointment, call the healthcare provider and tell them that you have or may have COVID-19. This will help the healthcare providers office take steps to keep other people from getting infected or exposed. Wear a facemask  You should wear a facemask when you are around other people (e.g., sharing a room or vehicle) or pets and before you enter a healthcare providers office. If you are not able to wear a facemask (for example, because it causes trouble breathing), then people who live with you should not stay in the same room with you; they should also wear a facemask if they enter your room. Cover your coughs and sneezes  Cover your mouth and nose with a tissue when you cough or sneeze. Throw used tissues in a lined trash can. Immediately wash your hands with soap and water for at least 20 seconds or, if soap and water are not available, clean your hands with an alcohol-based hand  that contains at least 60% alcohol. Clean your hands often  Wash your hands often with soap and water for at least 20 seconds, especially after blowing your nose, coughing, or sneezing; going to the bathroom; and before eating or preparing food. If soap and water are not readily available, use an alcohol-based hand  with at least 60% alcohol, covering all surfaces of your hands and rubbing them together until they feel dry. Soap and water are the best option if hands are visibly dirty. Avoid touching your eyes, nose, and mouth with unwashed hands. Avoid sharing personal household items  You should not share dishes, drinking glasses, cups, eating utensils, towels, or bedding with other people or pets in your home. After using these items, they should be washed thoroughly with soap and water.   Clean all high-touch surfaces everyday  High touch surfaces include counters, tabletops, doorknobs, bathroom fixtures, toilets, phones, keyboards, tablets, and bedside tables. Also, clean any surfaces that may have blood, stool, or body fluids on them. Use a household cleaning spray or wipe, according to the label instructions. Labels contain instructions for safe and effective use of the cleaning product including precautions you should take when applying the product, such as wearing gloves and making sure you have good ventilation during use of the product. Monitor your symptoms  Seek prompt medical attention if your illness is worsening (e.g., difficulty breathing). Before seeking care, call your healthcare provider and tell them that you have, or are being evaluated for, COVID-19. Put on a facemask before you enter the facility. These steps will help the healthcare providers office to keep other people in the office or waiting room from getting infected or exposed. Persons who are placed under active monitoring or facilitated self-monitoring should follow instructions provided by their local health department or occupational health professionals, as appropriate. When working with your local health department check their available hours. If you have a medical emergency and need to call 911, notify the dispatch personnel that you have, or are being evaluated for COVID-19. If possible, put on a facemask before emergency medical services arrive. Discontinuing home isolation  Patients with confirmed COVID-19 should remain under home isolation precautions until the risk of secondary transmission to others is thought to be low. The decision to discontinue home isolation precautions should be made on a case-by-case basis, in consultation with your physician and the health department. Please do NOT make this decision on your own. If your results of the COVID-19 test is NEGATIVE -     The patient may stop isolation, in consultation with your health care provider, typically when:   Your healthcare provider has determined that the cause of the illness is NOT COVID-19 and approves your return to work. OR  Ten (10) days have passed since onset of symptoms AND one day (24 hours) have passed with no fever without taking medication (like Tylenol) to reduce fever,  respiratory symptoms have resolved and you have been evaluated by your health care provider. Please follow up with your physician for evaluation about this. The following websites are the best places for up to date information on this fluid situation. [email or web address]      Katiuska Dapper- keeps someone who might have been exposed to the virus away from others  Isolation - keeps someone who is infected with the virus away from others, even in their homes    Scenario 1    Your patient has close contact with an individual who has COVID-19. Your patient will not have further contact. Plan - Your patient is quarantined from the last day of contact for 14 days    Scenario 2   Your patient has lives with someone who has COVID-19 but can avoid further contact. Plan - Your patient is quarantined for 14 days starting when the person with COVID-19 begins home isolation. Scenario 3    Your patient is under quarantine and has additional close contact with someone else who has COVID-19. Plan - Your patient must restart quarantine from the last COVID-19 exposure. Scenario 4   Your patient lives with someone who has COVID-19 and cannot avoid close contact from them. Plan - Your patient is quarantined while the other person is isolating and for 14 days after covid - 23 person meets the criteria to end home isolation.

## 2021-08-28 DIAGNOSIS — R05.9 COUGH: ICD-10-CM

## 2021-08-28 DIAGNOSIS — Z20.822 SUSPECTED COVID-19 VIRUS INFECTION: ICD-10-CM

## 2021-08-28 DIAGNOSIS — R53.83 FATIGUE, UNSPECIFIED TYPE: ICD-10-CM

## 2021-08-28 DIAGNOSIS — R43.2 LOSS OF TASTE: ICD-10-CM

## 2021-08-28 LAB
SARS-COV-2: ABNORMAL
SARS-COV-2: DETECTED
SOURCE: ABNORMAL

## 2023-01-24 ENCOUNTER — OFFICE VISIT (OUTPATIENT)
Dept: FAMILY MEDICINE CLINIC | Age: 28
End: 2023-01-24
Payer: COMMERCIAL

## 2023-01-24 VITALS
WEIGHT: 152 LBS | OXYGEN SATURATION: 98 % | SYSTOLIC BLOOD PRESSURE: 129 MMHG | HEART RATE: 87 BPM | BODY MASS INDEX: 23.04 KG/M2 | HEIGHT: 68 IN | DIASTOLIC BLOOD PRESSURE: 84 MMHG | RESPIRATION RATE: 14 BRPM | TEMPERATURE: 98.6 F

## 2023-01-24 DIAGNOSIS — J06.9 ACUTE URI: Primary | ICD-10-CM

## 2023-01-24 PROCEDURE — 99203 OFFICE O/P NEW LOW 30 MIN: CPT | Performed by: REGISTERED NURSE

## 2023-01-24 PROCEDURE — 1036F TOBACCO NON-USER: CPT | Performed by: REGISTERED NURSE

## 2023-01-24 PROCEDURE — G8420 CALC BMI NORM PARAMETERS: HCPCS | Performed by: REGISTERED NURSE

## 2023-01-24 PROCEDURE — G8427 DOCREV CUR MEDS BY ELIG CLIN: HCPCS | Performed by: REGISTERED NURSE

## 2023-01-24 PROCEDURE — G8484 FLU IMMUNIZE NO ADMIN: HCPCS | Performed by: REGISTERED NURSE

## 2023-01-24 RX ORDER — BENZONATATE 200 MG/1
200 CAPSULE ORAL 3 TIMES DAILY PRN
Qty: 30 CAPSULE | Refills: 0 | Status: SHIPPED | OUTPATIENT
Start: 2023-01-24 | End: 2023-02-03

## 2023-01-24 RX ORDER — AZITHROMYCIN 250 MG/1
250 TABLET, FILM COATED ORAL SEE ADMIN INSTRUCTIONS
Qty: 6 TABLET | Refills: 0 | Status: SHIPPED | OUTPATIENT
Start: 2023-01-24 | End: 2023-01-29

## 2023-01-24 RX ORDER — PREDNISONE 20 MG/1
20 TABLET ORAL 2 TIMES DAILY
Qty: 10 TABLET | Refills: 0 | Status: SHIPPED | OUTPATIENT
Start: 2023-01-24 | End: 2023-01-29

## 2023-01-24 RX ORDER — DEXTROAMPHETAMINE SACCHARATE, AMPHETAMINE ASPARTATE MONOHYDRATE, DEXTROAMPHETAMINE SULFATE AND AMPHETAMINE SULFATE 5; 5; 5; 5 MG/1; MG/1; MG/1; MG/1
CAPSULE, EXTENDED RELEASE ORAL
COMMUNITY
Start: 2022-12-19

## 2023-01-24 SDOH — ECONOMIC STABILITY: FOOD INSECURITY: WITHIN THE PAST 12 MONTHS, THE FOOD YOU BOUGHT JUST DIDN'T LAST AND YOU DIDN'T HAVE MONEY TO GET MORE.: NEVER TRUE

## 2023-01-24 SDOH — ECONOMIC STABILITY: FOOD INSECURITY: WITHIN THE PAST 12 MONTHS, YOU WORRIED THAT YOUR FOOD WOULD RUN OUT BEFORE YOU GOT MONEY TO BUY MORE.: NEVER TRUE

## 2023-01-24 ASSESSMENT — SOCIAL DETERMINANTS OF HEALTH (SDOH): HOW HARD IS IT FOR YOU TO PAY FOR THE VERY BASICS LIKE FOOD, HOUSING, MEDICAL CARE, AND HEATING?: NOT HARD AT ALL

## 2023-01-24 ASSESSMENT — PATIENT HEALTH QUESTIONNAIRE - PHQ9
SUM OF ALL RESPONSES TO PHQ QUESTIONS 1-9: 0
1. LITTLE INTEREST OR PLEASURE IN DOING THINGS: 0
SUM OF ALL RESPONSES TO PHQ9 QUESTIONS 1 & 2: 0
SUM OF ALL RESPONSES TO PHQ QUESTIONS 1-9: 0
2. FEELING DOWN, DEPRESSED OR HOPELESS: 0

## 2023-01-24 ASSESSMENT — ENCOUNTER SYMPTOMS
COUGH: 1
WHEEZING: 1
EYES NEGATIVE: 1
CHEST TIGHTNESS: 1
SORE THROAT: 0
SHORTNESS OF BREATH: 1
GASTROINTESTINAL NEGATIVE: 1

## 2023-01-25 NOTE — PROGRESS NOTES
[address] [facility]  [address] [place] [facility] [address]  [address] [address]  Dept: [phone]  Dept Fax: [phone]    Fabienne Sethi is a 28 y.o. female who presents today for her medical conditions/complaints of   Chief Complaint   Patient presents with    Cough     Had some sinus issues but has cleared     Congestion     Chest - concerned about pneumonia due to history          HPI:     /84   Pulse 87   Temp 98.6 °F (37 °C) (Temporal)   Resp 14   Ht 5' 8\" (1.727 m)   Wt 152 lb (68.9 kg)   SpO2 98%   BMI 23.11 kg/m²       Cough  This is a new problem. The current episode started 1 to 4 weeks ago (around 3 weeks ago). The problem has been gradually worsening. The problem occurs every few minutes. The cough is Non-productive. Associated symptoms include shortness of breath and wheezing. Pertinent negatives include no chest pain, chills, ear pain, fever or sore throat. Associated symptoms comments: Is having chest tightness . The symptoms are aggravated by exercise. She has tried OTC cough suppressant and a beta-agonist inhaler for the symptoms. The treatment provided mild relief. Her past medical history is significant for asthma and pneumonia. There is no history of bronchitis or COPD. Patient went for a run three days ago, symptoms have possibly gotten worse since then. She does not smoke, no recent travel, no recent surgeries or hospitalizations, no oral BC. No past medical history on file. No past surgical history on file. No family history on file. Social History     Tobacco Use    Smoking status: Never    Smokeless tobacco: Never   Substance Use Topics    Alcohol use: Never        Prior to Visit Medications    Medication Sig Taking?  Authorizing Provider   amphetamine-dextroamphetamine (ADDERALL XR) 20 MG extended release capsule take 1 capsule by mouth once daily for 30 DAYS Yes Historical Provider, MD predniSONE (DELTASONE) 20 MG tablet Take 1 tablet by mouth 2 times daily for 5 days Yes MARC Welch CNP   azithromycin (ZITHROMAX) 250 MG tablet Take 1 tablet by mouth See Admin Instructions for 5 days 500mg on day 1 followed by 250mg on days 2 - 5 Yes MARC Welch CNP   benzonatate (TESSALON) 200 MG capsule Take 1 capsule by mouth 3 times daily as needed for Cough Yes MARC Welch CNP   albuterol sulfate HFA (PROVENTIL;VENTOLIN;PROAIR) 108 (90 Base) MCG/ACT inhaler inhale 2 puffs by mouth every 4 to 6 hours if needed for wheezing Yes Historical Provider, MD   ALPRAZolam (XANAX) 0.5 MG tablet Take 0.5 mg by mouth. Yes Historical Provider, MD   fluticasone (FLONASE) 50 MCG/ACT nasal spray 2 sprays by Nasal route daily  Patient not taking: Reported on 1/24/2023  MARC White CNP   aspirin 81 MG chewable tablet Take 81 mg by mouth  Patient not taking: Reported on 1/24/2023  Historical Provider, MD       Allergies   Allergen Reactions    Latex     Sulfamethoxazole-Trimethoprim Other (See Comments) and Hives         Subjective:      Review of Systems   Constitutional:  Negative for chills and fever. HENT:  Negative for ear pain and sore throat. Eyes: Negative. Respiratory:  Positive for cough, chest tightness, shortness of breath and wheezing. Cardiovascular:  Negative for chest pain, palpitations and leg swelling. Gastrointestinal: Negative. Genitourinary: Negative. Musculoskeletal: Negative. Neurological: Negative. Psychiatric/Behavioral: Negative. Objective:     Physical Exam  Constitutional:       General: She is not in acute distress. Appearance: Normal appearance. She is normal weight. She is not ill-appearing, toxic-appearing or diaphoretic. HENT:      Nose: Nose normal.      Mouth/Throat:      Mouth: Mucous membranes are moist.      Pharynx: Oropharynx is clear. No oropharyngeal exudate or posterior oropharyngeal erythema. Eyes:      Conjunctiva/sclera: Conjunctivae normal.   Cardiovascular:      Rate and Rhythm: Normal rate and regular rhythm. Heart sounds: Normal heart sounds. Pulmonary:      Effort: Pulmonary effort is normal. No tachypnea, accessory muscle usage or respiratory distress. Breath sounds: Normal breath sounds. No stridor. No wheezing, rhonchi or rales. Skin:     General: Skin is warm. Coloration: Skin is not pale. Neurological:      General: No focal deficit present. Mental Status: She is alert and oriented to person, place, and time. Psychiatric:         Mood and Affect: Mood normal.         MEDICAL DECISION MAKING Assessment/Plan:     Danni was seen today for cough and congestion. Diagnoses and all orders for this visit:    Acute URI  -     predniSONE (DELTASONE) 20 MG tablet; Take 1 tablet by mouth 2 times daily for 5 days  -     azithromycin (ZITHROMAX) 250 MG tablet; Take 1 tablet by mouth See Admin Instructions for 5 days 500mg on day 1 followed by 250mg on days 2 - 5  -     benzonatate (TESSALON) 200 MG capsule; Take 1 capsule by mouth 3 times daily as needed for Cough    Based on the patient's history and exam will treat as URI. The patient does not have clinical findings suggestive of pneumonia. Will start on zpack for symptoms of URI that are worsening over the past several week. Prednisone and tessalon for symptom control. There is no abnormal vital signs (pulse is not greater than 100/ minute, respirations are not greater than 24/ minute, temperature is not greater than 38 degrees Celsius, or oxygen saturation less than 95 percent) There is no tachypnea, rales, or signs of parenchymal consolidation on exam. There are no changes in mental status or behavioral changes. Pt to fill and take medications as prescribed. Rest, increase fluids. Return if no improvement in symptoms. Go to the ER for any emergent concern, such as trouble breathing and or chest pain. Results for orders placed or performed during the hospital encounter of 08/27/21   COVID-19    Specimen: Nasopharyngeal Swab   Result Value Ref Range    SARS-CoV-2          Source . NASOPHARYNGEAL SWAB     SARS-CoV-2 DETECTED (A) Not Detected       Patient counseled:     Patient given educational materials - see patientinstructions. Discussed use, benefit, and side effects of prescribed medications. All patient questions answered. Pt verbalized understanding. Instructed to continue current medications, diet and exercise. Patient agreed with treatment plan. Follow up as directed.      Electronically signed by MARC Ingram CNP on 1/24/2023 at 7:48 PM

## 2023-01-31 ENCOUNTER — HOSPITAL ENCOUNTER (OUTPATIENT)
Age: 28
Discharge: HOME | End: 2023-02-02
Payer: COMMERCIAL

## 2023-01-31 ENCOUNTER — HOSPITAL ENCOUNTER (OUTPATIENT)
Dept: GENERAL RADIOLOGY | Age: 28
Discharge: HOME | End: 2023-02-02
Payer: COMMERCIAL

## 2023-01-31 DIAGNOSIS — R06.02 SHORTNESS OF BREATH: ICD-10-CM

## 2023-01-31 DIAGNOSIS — J06.9 ACUTE URI: Primary | ICD-10-CM

## 2023-01-31 DIAGNOSIS — R06.02 SHORTNESS OF BREATH: Primary | ICD-10-CM

## 2023-01-31 DIAGNOSIS — R05.1 ACUTE COUGH: Primary | ICD-10-CM

## 2023-01-31 PROCEDURE — 71046 X-RAY EXAM CHEST 2 VIEWS: CPT

## 2023-01-31 RX ORDER — METHYLPREDNISOLONE 4 MG/1
TABLET ORAL
Qty: 1 KIT | Refills: 0 | Status: SHIPPED | OUTPATIENT
Start: 2023-01-31 | End: 2023-02-06

## 2023-01-31 RX ORDER — ALBUTEROL SULFATE 90 UG/1
1-2 AEROSOL, METERED RESPIRATORY (INHALATION) EVERY 6 HOURS PRN
Qty: 18 G | Refills: 0 | Status: SHIPPED | OUTPATIENT
Start: 2023-01-31

## 2024-05-01 ENCOUNTER — HOSPITAL ENCOUNTER (OUTPATIENT)
Age: 29
Setting detail: SPECIMEN
Discharge: HOME | End: 2024-05-01
Payer: COMMERCIAL

## 2024-05-01 LAB
B-HCG SERPL EIA 3RD IS-ACNC: <0.2 MIU/ML (ref 0–7)
BACTERIA URNS QL MICRO: NORMAL
CASTS #/AREA URNS LPF: NORMAL /LPF (ref 0–8)
EPI CELLS #/AREA URNS HPF: NORMAL /HPF (ref 0–5)
RBC #/AREA URNS HPF: NORMAL /HPF (ref 0–4)
WBC #/AREA URNS HPF: NORMAL /HPF (ref 0–5)

## 2024-05-01 PROCEDURE — 84702 CHORIONIC GONADOTROPIN TEST: CPT

## 2024-05-01 PROCEDURE — 81015 MICROSCOPIC EXAM OF URINE: CPT

## 2024-05-06 ENCOUNTER — HOSPITAL ENCOUNTER (OUTPATIENT)
Age: 29
Setting detail: SPECIMEN
Discharge: HOME | End: 2024-05-06
Payer: COMMERCIAL

## 2024-05-06 PROCEDURE — 87491 CHLMYD TRACH DNA AMP PROBE: CPT

## 2024-05-06 PROCEDURE — 87591 N.GONORRHOEAE DNA AMP PROB: CPT

## 2024-05-07 LAB
CHLAMYDIA DNA UR QL NAA+PROBE: NEGATIVE
N GONORRHOEA DNA UR QL NAA+PROBE: NEGATIVE
SPECIMEN DESCRIPTION: NORMAL

## 2024-11-05 ENCOUNTER — HOSPITAL ENCOUNTER (OUTPATIENT)
Age: 29
Setting detail: SPECIMEN
Discharge: HOME | End: 2024-11-05
Payer: COMMERCIAL

## 2024-11-05 LAB
ALBUMIN SERPL-MCNC: 5.3 G/DL (ref 3.5–5.2)
ALBUMIN/GLOB SERPL: 1.8 {RATIO} (ref 1–2.5)
ALP SERPL-CCNC: 53 U/L (ref 35–104)
ALT SERPL-CCNC: 10 U/L (ref 10–35)
ANION GAP SERPL CALCULATED.3IONS-SCNC: 13 MMOL/L (ref 9–16)
AST SERPL-CCNC: 19 U/L (ref 10–35)
BILIRUB SERPL-MCNC: 1 MG/DL (ref 0–1.2)
BUN SERPL-MCNC: 11 MG/DL (ref 6–20)
CALCIUM SERPL-MCNC: 10 MG/DL (ref 8.6–10.4)
CHLORIDE SERPL-SCNC: 102 MMOL/L (ref 98–107)
CHOLEST SERPL-MCNC: 191 MG/DL (ref 0–199)
CHOLESTEROL/HDL RATIO: 2.8
CO2 SERPL-SCNC: 26 MMOL/L (ref 20–31)
CREAT SERPL-MCNC: 1 MG/DL (ref 0.6–0.9)
ERYTHROCYTE [DISTWIDTH] IN BLOOD BY AUTOMATED COUNT: 12.2 % (ref 11.8–14.4)
GFR, ESTIMATED: 78 ML/MIN/1.73M2
GLUCOSE SERPL-MCNC: 77 MG/DL (ref 74–99)
HCT VFR BLD AUTO: 43.4 % (ref 36.3–47.1)
HDLC SERPL-MCNC: 68 MG/DL
HGB BLD-MCNC: 14.4 G/DL (ref 11.9–15.1)
LDLC SERPL CALC-MCNC: 102 MG/DL (ref 0–100)
MCH RBC QN AUTO: 32.2 PG (ref 25.2–33.5)
MCHC RBC AUTO-ENTMCNC: 33.2 G/DL (ref 28.4–34.8)
MCV RBC AUTO: 97.1 FL (ref 82.6–102.9)
NRBC BLD-RTO: 0 PER 100 WBC
PLATELET # BLD AUTO: 263 K/UL (ref 138–453)
PMV BLD AUTO: 10.5 FL (ref 8.1–13.5)
POTASSIUM SERPL-SCNC: 3.8 MMOL/L (ref 3.7–5.3)
PROT SERPL-MCNC: 8.2 G/DL (ref 6.6–8.7)
RBC # BLD AUTO: 4.47 M/UL (ref 3.95–5.11)
SODIUM SERPL-SCNC: 141 MMOL/L (ref 136–145)
TRIGL SERPL-MCNC: 106 MG/DL
TSH SERPL DL<=0.05 MIU/L-ACNC: 1.28 UIU/ML (ref 0.27–4.2)
VLDLC SERPL CALC-MCNC: 21 MG/DL (ref 1–30)
WBC OTHER # BLD: 7.2 K/UL (ref 3.5–11.3)

## 2024-11-05 PROCEDURE — 80061 LIPID PANEL: CPT

## 2024-11-05 PROCEDURE — 84443 ASSAY THYROID STIM HORMONE: CPT

## 2024-11-05 PROCEDURE — 85027 COMPLETE CBC AUTOMATED: CPT

## 2024-11-05 PROCEDURE — 80053 COMPREHEN METABOLIC PANEL: CPT

## 2025-04-29 ENCOUNTER — HOSPITAL ENCOUNTER (OUTPATIENT)
Age: 30
Setting detail: SPECIMEN
Discharge: HOME | End: 2025-04-29
Payer: COMMERCIAL

## 2025-04-29 LAB
BILIRUB UR QL STRIP: NEGATIVE
CLARITY UR: ABNORMAL
COLOR UR: YELLOW
EPI CELLS #/AREA URNS HPF: NORMAL /HPF (ref 0–5)
GLUCOSE UR STRIP-MCNC: NEGATIVE MG/DL
HGB UR QL STRIP.AUTO: ABNORMAL
KETONES UR STRIP-MCNC: NEGATIVE MG/DL
LEUKOCYTE ESTERASE UR QL STRIP: NEGATIVE
MUCOUS THREADS URNS QL MICRO: NORMAL
NITRITE UR QL STRIP: NEGATIVE
PH UR STRIP: 5.5 [PH] (ref 5–8)
PROT UR STRIP-MCNC: NEGATIVE MG/DL
RBC #/AREA URNS HPF: NORMAL /HPF (ref 0–2)
SP GR UR STRIP: 1.02 (ref 1–1.03)
UROBILINOGEN UR STRIP-ACNC: NORMAL EU/DL (ref 0–1)
WBC #/AREA URNS HPF: NORMAL /HPF (ref 0–5)

## 2025-04-29 PROCEDURE — 81001 URINALYSIS AUTO W/SCOPE: CPT
